# Patient Record
Sex: FEMALE | Race: BLACK OR AFRICAN AMERICAN | NOT HISPANIC OR LATINO | Employment: STUDENT | URBAN - METROPOLITAN AREA
[De-identification: names, ages, dates, MRNs, and addresses within clinical notes are randomized per-mention and may not be internally consistent; named-entity substitution may affect disease eponyms.]

---
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
## 2023-05-20 ENCOUNTER — HOSPITAL ENCOUNTER (EMERGENCY)
Facility: HOSPITAL | Age: 18
Discharge: HOME | End: 2023-05-20
Attending: EMERGENCY MEDICINE
Payer: MEDICAID

## 2023-05-20 VITALS
DIASTOLIC BLOOD PRESSURE: 76 MMHG | HEART RATE: 84 BPM | TEMPERATURE: 98 F | HEIGHT: 60 IN | BODY MASS INDEX: 27.42 KG/M2 | RESPIRATION RATE: 14 BRPM | SYSTOLIC BLOOD PRESSURE: 112 MMHG | WEIGHT: 139.69 LBS | OXYGEN SATURATION: 100 %

## 2023-05-20 DIAGNOSIS — G89.29 CHRONIC HAND PAIN, RIGHT: ICD-10-CM

## 2023-05-20 DIAGNOSIS — M79.641 CHRONIC HAND PAIN, RIGHT: ICD-10-CM

## 2023-05-20 DIAGNOSIS — R21 RASH: Primary | ICD-10-CM

## 2023-05-20 PROCEDURE — 99284 EMERGENCY DEPT VISIT MOD MDM: CPT

## 2023-05-20 RX ORDER — PREDNISONE 20 MG/1
40 TABLET ORAL DAILY
Qty: 10 TABLET | Refills: 0 | Status: SHIPPED | OUTPATIENT
Start: 2023-05-20 | End: 2023-05-25

## 2023-05-20 RX ORDER — HYDROXYZINE HYDROCHLORIDE 25 MG/1
25 TABLET, FILM COATED ORAL 3 TIMES DAILY PRN
Qty: 20 TABLET | Refills: 0 | Status: SHIPPED | OUTPATIENT
Start: 2023-05-20

## 2023-05-20 NOTE — Clinical Note
"JOSELUIS Nicole "A'Tarayell" Walker was seen and treated in our emergency department on 5/20/2023.  She may return to work on 05/22/2023.       If you have any questions or concerns, please don't hesitate to call.      SILVINO Nova"

## 2023-05-20 NOTE — ED PROVIDER NOTES
Encounter Date: 5/20/2023       History     Chief Complaint   Patient presents with    Rash     Pt in with complaints of a rash and hives to the entire body that started 2 days ago.  Pt reports swelling and pain to the right arm.  Denies any shortness of breath or chest pain.     The patient presents with rash and skin problem.  The onset was 2 days ago.  The course/duration of symptoms is constant.  Location: generalized. The character of symptoms is itching.  Radiating symptom(s): none.  The degree of symptoms is minimal.  Risk factors consist of none.  Prior episodes: none.  Associated symptoms: denies fever, denies chills and denies shortness of breath. She also reports chronic right hand pain that she is going to see her pcp for next week.    Review of patient's allergies indicates:   Allergen Reactions    Grass pollen-june grass standard Rash     History reviewed. No pertinent past medical history.  Past Surgical History:   Procedure Laterality Date    SURGICAL REMOVAL OF BUNION WITH OSTEOTOMY OF METATARSAL BONE      TONSILLECTOMY       History reviewed. No pertinent family history.  Social History     Tobacco Use    Smoking status: Every Day     Types: Vaping with nicotine    Smokeless tobacco: Never   Substance Use Topics    Alcohol use: Never    Drug use: Never     Review of Systems   Constitutional:  Negative for fever.   HENT:  Negative for sore throat.    Respiratory:  Negative for shortness of breath.    Cardiovascular:  Negative for chest pain.   Gastrointestinal:  Negative for nausea.   Genitourinary:  Negative for dysuria.   Musculoskeletal:  Negative for back pain.   Skin:  Positive for rash.   Neurological:  Negative for weakness.   Hematological:  Does not bruise/bleed easily.   All other systems reviewed and are negative.    Physical Exam     Initial Vitals [05/20/23 1755]   BP Pulse Resp Temp SpO2   112/76 84 14 98.4 °F (36.9 °C) 100 %      MAP       --         Physical Exam    Nursing note and  vitals reviewed.  Constitutional: She appears well-developed and well-nourished.   HENT:   Head: Normocephalic and atraumatic.   Neck: Neck supple.   Normal range of motion.  Cardiovascular:  Normal rate, regular rhythm, normal heart sounds and intact distal pulses.           Pulmonary/Chest: Breath sounds normal.   Abdominal: Abdomen is soft. Bowel sounds are normal.   Musculoskeletal:         General: Normal range of motion.      Cervical back: Normal range of motion and neck supple.      Comments: Mild ttp right thumb without swelling, FROM, good distal pulses, brisk cap refill, no snuffbox ttp     Neurological: She is alert. She has normal strength.   Skin: Skin is warm and dry.   Few scattered whelps to trunk and extremities consistent with urticaria   Psychiatric: She has a normal mood and affect.       ED Course   Procedures  Labs Reviewed - No data to display       Imaging Results    None          Medications - No data to display  Medical Decision Making:   History:   Old Records Summarized: records from clinic visits and records from previous admission(s).  Was offered xray of right hand - patient refused. She reports she will see her pcp next week. Will take otc pain medication if needed.    7:07 PM DISPOSITION: The patient is resting comfortably in no acute distress.  She is hemodynamically stable and is without objective evidence for acute process requiring urgent intervention or hospitalization. I provided counseling to patient with regard to condition, the treatment plan, specific conditions for return, and the importance of follow up. Detailed written and verbal instructions provided to patient and she expressed a verbal understanding of the discharge instructions and overall management plan. Reiterated the importance of medication administration and safety and advised patient to follow up with primary care provider in 3-5 days or sooner if needed.  Answered questions at this time. The patient is  stable for discharge.                           Clinical Impression:   Final diagnoses:  [R21] Rash (Primary)  [M79.641, G89.29] Chronic hand pain, right        ED Disposition Condition    Discharge Stable          ED Prescriptions       Medication Sig Dispense Start Date End Date Auth. Provider    predniSONE (DELTASONE) 20 MG tablet Take 2 tablets (40 mg total) by mouth once daily. for 5 days 10 tablet 5/20/2023 5/25/2023 SILVINO Nova    hydrOXYzine HCL (ATARAX) 25 MG tablet Take 1 tablet (25 mg total) by mouth 3 (three) times daily as needed for Itching. May cause drowsiness 20 tablet 5/20/2023 -- SILVINO Nova          Follow-up Information       Follow up With Specialties Details Why Contact Info    follow up with your primary care provider in 3-5 days        [facility] Emergency Medicine  If symptoms worsen [address] [facility] [address] [phone]             SILVINO Nova  05/20/23 8063

## 2023-06-22 ENCOUNTER — HOSPITAL ENCOUNTER (EMERGENCY)
Facility: HOSPITAL | Age: 18
Discharge: HOME | End: 2023-06-22
Attending: INTERNAL MEDICINE
Payer: MEDICAID

## 2023-06-22 VITALS
HEART RATE: 65 BPM | OXYGEN SATURATION: 100 % | SYSTOLIC BLOOD PRESSURE: 130 MMHG | BODY MASS INDEX: 25.55 KG/M2 | TEMPERATURE: 98 F | WEIGHT: 138.88 LBS | DIASTOLIC BLOOD PRESSURE: 75 MMHG | HEIGHT: 62 IN | RESPIRATION RATE: 18 BRPM

## 2023-06-22 DIAGNOSIS — Z32.01 POSITIVE PREGNANCY TEST: Primary | ICD-10-CM

## 2023-06-22 PROCEDURE — 99283 EMERGENCY DEPT VISIT LOW MDM: CPT

## 2023-06-22 RX ORDER — FAMOTIDINE 20 MG
1 TABLET ORAL DAILY
Qty: 30 TABLET | Refills: 0 | Status: SHIPPED | OUTPATIENT
Start: 2023-06-22 | End: 2023-07-22

## 2023-06-22 NOTE — ED PROVIDER NOTES
Encounter Date: 6/22/2023       History     Chief Complaint   Patient presents with    Positive UPT     Went to urgent care for pregnancy test.  Was positive. Now wants to see how far along she is.     Patient with no pmhx presents today requesting to know gestational age. Patient presented to an urgent care today for a pregnancy test because her period was late. Test result was positive. Her LMP was 5/18/23. This is her first pregnancy. She denies any acute complaints. Denies pelvic pain or vaginal bleeding.     The history is provided by the patient. No  was used.   Review of patient's allergies indicates:   Allergen Reactions    Grass pollen-june grass standard Rash     No past medical history on file.  Past Surgical History:   Procedure Laterality Date    SURGICAL REMOVAL OF BUNION WITH OSTEOTOMY OF METATARSAL BONE      TONSILLECTOMY       No family history on file.  Social History     Tobacco Use    Smoking status: Every Day     Types: Vaping with nicotine    Smokeless tobacco: Never   Substance Use Topics    Alcohol use: Never    Drug use: Never     Review of Systems   Constitutional:  Negative for chills and fever.   Respiratory:  Negative for cough, chest tightness and shortness of breath.    Cardiovascular:  Negative for chest pain.   Gastrointestinal:  Negative for abdominal pain, constipation, diarrhea, nausea and vomiting.   Genitourinary:  Negative for dysuria, flank pain, hematuria, pelvic pain and vaginal bleeding.   Musculoskeletal:  Negative for back pain.   Skin:  Negative for rash.   Neurological:  Negative for syncope, light-headedness and headaches.   All other systems reviewed and are negative.    Physical Exam     Initial Vitals [06/22/23 1705]   BP Pulse Resp Temp SpO2   130/75 65 18 98 °F (36.7 °C) 100 %      MAP       --         Physical Exam    Nursing note and vitals reviewed.  Constitutional: She appears well-developed and well-nourished. She is not diaphoretic. No  distress.   HENT:   Head: Normocephalic and atraumatic.   Mouth/Throat: Oropharynx is clear and moist. No oropharyngeal exudate.   Eyes: Conjunctivae and EOM are normal.   Neck: Neck supple.   Normal range of motion.  Cardiovascular:  Normal rate, regular rhythm, normal heart sounds and intact distal pulses.           Pulmonary/Chest: Breath sounds normal. No respiratory distress.   Abdominal: Abdomen is soft. She exhibits no distension. There is no abdominal tenderness. There is no rebound and no guarding.   Musculoskeletal:         General: No edema.      Cervical back: Normal range of motion and neck supple.     Neurological: She is alert and oriented to person, place, and time. GCS score is 15. GCS eye subscore is 4. GCS verbal subscore is 5. GCS motor subscore is 6.   Skin: Skin is warm and dry. Capillary refill takes less than 2 seconds. No rash noted.   Psychiatric: She has a normal mood and affect.       ED Course   Procedures  Labs Reviewed - No data to display       Imaging Results    None          Medications - No data to display  Medical Decision Making:   History:   Old Records Summarized: other records.  Initial Assessment:   Newly pregnant patient requesting to find out gestational age   Differential Diagnosis:   Chemical pregnancy, lab error,   Patient is non-toxic appearing. Her vitals are stable. Patient informed she is about 6 weeks per LMP. She understands there is no indication for an ultrasound today. I will refer her to OB clinic. Prenatal care discussed. Rx for pnvs sent to pharmacy. ED precautions given.                     Clinical Impression:   Final diagnoses:  [Z32.01] Positive pregnancy test (Primary)      ED Disposition Condition    Discharge Stable          ED Prescriptions       Medication Sig Dispense Start Date End Date Auth. Provider    prenatal 21-iron fu-folic acid (PRENATAL COMPLETE) 14 mg iron- 400 mcg Tab Take 1 tablet by mouth once daily. 30 tablet 6/22/2023 7/22/2023 Deanna  RICH Alexandra          Follow-up Information       Follow up With Specialties Details Why Contact Info    [facility] Emergency Medicine  If symptoms worsen return to ED immediately [address] [facility] [id number] [phone]    Primary Care Provider within 1-2 days  Go in 2 days      [facility] Obstetrics In 2 days  [address] [facility] [id number] [phone]             RICH Wilson  06/22/23 1730

## 2023-07-10 ENCOUNTER — HOSPITAL ENCOUNTER (EMERGENCY)
Facility: HOSPITAL | Age: 18
Discharge: HOME | End: 2023-07-11
Attending: STUDENT IN AN ORGANIZED HEALTH CARE EDUCATION/TRAINING PROGRAM
Payer: MEDICAID

## 2023-07-10 VITALS
WEIGHT: 138 LBS | HEART RATE: 88 BPM | TEMPERATURE: 99 F | DIASTOLIC BLOOD PRESSURE: 76 MMHG | OXYGEN SATURATION: 100 % | SYSTOLIC BLOOD PRESSURE: 143 MMHG | RESPIRATION RATE: 16 BRPM | BODY MASS INDEX: 25.4 KG/M2 | HEIGHT: 62 IN

## 2023-07-10 DIAGNOSIS — M25.511 ACUTE PAIN OF RIGHT SHOULDER: ICD-10-CM
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
## 2023-07-10 DIAGNOSIS — T07.XXXA MULTIPLE ABRASIONS: ICD-10-CM

## 2023-07-10 DIAGNOSIS — M25.521 RIGHT ELBOW PAIN: ICD-10-CM

## 2023-07-10 DIAGNOSIS — M23.91 ACUTE INTERNAL DERANGEMENT OF RIGHT KNEE: Primary | ICD-10-CM

## 2023-07-10 PROCEDURE — 99284 EMERGENCY DEPT VISIT MOD MDM: CPT | Mod: 25

## 2023-07-10 PROCEDURE — 25000003 PHARM REV CODE 250: Performed by: PHYSICIAN ASSISTANT

## 2023-07-10 PROCEDURE — 25000003 PHARM REV CODE 250: Performed by: NURSE PRACTITIONER

## 2023-07-10 PROCEDURE — 29505 APPLICATION LONG LEG SPLINT: CPT | Mod: RT

## 2023-07-10 RX ORDER — CYCLOBENZAPRINE HCL 10 MG
10 TABLET ORAL 3 TIMES DAILY PRN
Qty: 15 TABLET | Refills: 0 | Status: SHIPPED | OUTPATIENT
Start: 2023-07-10 | End: 2023-07-15

## 2023-07-10 RX ORDER — ACETAMINOPHEN 500 MG
1000 TABLET ORAL
Status: COMPLETED | OUTPATIENT
Start: 2023-07-10 | End: 2023-07-10

## 2023-07-10 RX ORDER — CYCLOBENZAPRINE HCL 10 MG
10 TABLET ORAL
Status: COMPLETED | OUTPATIENT
Start: 2023-07-10 | End: 2023-07-10

## 2023-07-10 RX ADMIN — CYCLOBENZAPRINE 10 MG: 10 TABLET, FILM COATED ORAL at 10:07

## 2023-07-10 RX ADMIN — ACETAMINOPHEN 1000 MG: 500 TABLET, FILM COATED ORAL at 08:07

## 2023-07-10 NOTE — Clinical Note
"JOSELUIS Nicole "A'Bonnie" Walker was seen and treated in our emergency department on 7/10/2023.  She may return to work on 07/17/2023.       If you have any questions or concerns, please don't hesitate to call.      queta sandoval rn RN    "
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
## 2023-07-11 NOTE — DISCHARGE INSTRUCTIONS
Ice to knee and arm to help with swelling. Tylenol for pain (may take 1000mg every 6 hours for pain). Cyclobenzaprine for muscle spasms/tightness as needed. Keep abrasions clean with soap and water and apply triple antibiotic ointment to them. Wear knee immobilizer for the next 2 weeks and use crutches as needed. Once you feel you can put weight on right leg you may do so especially if you

## 2023-09-28 ENCOUNTER — TELEPHONE (OUTPATIENT)
Dept: OBSTETRICS AND GYNECOLOGY | Facility: CLINIC | Age: 18
End: 2023-09-28
Payer: MEDICAID

## 2023-10-24 ENCOUNTER — ROUTINE PRENATAL (OUTPATIENT)
Dept: OBSTETRICS AND GYNECOLOGY | Facility: CLINIC | Age: 18
End: 2023-10-24
Payer: MEDICAID

## 2023-10-24 VITALS
DIASTOLIC BLOOD PRESSURE: 78 MMHG | BODY MASS INDEX: 27.98 KG/M2 | SYSTOLIC BLOOD PRESSURE: 114 MMHG | WEIGHT: 153 LBS | HEART RATE: 93 BPM

## 2023-10-24 DIAGNOSIS — F12.10 TETRAHYDROCANNABINOL (THC) USE DISORDER, MILD, ABUSE: ICD-10-CM

## 2023-10-24 DIAGNOSIS — Z11.3 SCREENING EXAMINATION FOR VENEREAL DISEASE: ICD-10-CM

## 2023-10-24 DIAGNOSIS — Z34.02 ENCOUNTER FOR SUPERVISION OF NORMAL FIRST PREGNANCY IN SECOND TRIMESTER: Primary | ICD-10-CM

## 2023-10-24 DIAGNOSIS — Z3A.22 22 WEEKS GESTATION OF PREGNANCY: ICD-10-CM

## 2023-10-24 PROBLEM — Z34.92 ENCOUNTER FOR SUPERVISION OF NORMAL PREGNANCY IN SECOND TRIMESTER: Status: ACTIVE | Noted: 2023-10-24

## 2023-10-24 PROCEDURE — 99203 OFFICE O/P NEW LOW 30 MIN: CPT | Mod: TH,S$GLB,, | Performed by: STUDENT IN AN ORGANIZED HEALTH CARE EDUCATION/TRAINING PROGRAM

## 2023-10-24 PROCEDURE — 99203 PR OFFICE/OUTPT VISIT, NEW, LEVL III, 30-44 MIN: ICD-10-PCS | Mod: TH,S$GLB,, | Performed by: STUDENT IN AN ORGANIZED HEALTH CARE EDUCATION/TRAINING PROGRAM

## 2023-10-24 NOTE — PROGRESS NOTES
CC: Follow-Up OB    HPI:   18 y.o.  at 22w5d with EDC of 2024, by [facility], here for her follow up OB visit. Complains of nothing today.  Patient is transferring care today from [place].  She reports she is not had any prenatal labs performed so far this pregnancy.  Will obtain these today.  She denies any past medical history.  She reports put surgery and tonsillectomy.  Positive THC use.  She denies any family history.  Denies allergies to medicine.  Denies any medications..    Pregnancy ROS  Positive fetal movement   Negative leakage of fluid   Negative vaginal bleeding   Negative headache, vision changes, RUQ pain, epigastric pain  Negative contractions/abdominal pain   Negative pelvic pressure     Physical Exam:  Prenatal Vitals  BP: 114/78  Weight: 69.4 kg (153 lb)    Wt Readings from Last 3 Encounters:   10/24/23 69.4 kg (153 lb)   07/10/23 62.6 kg (138 lb)   23 63 kg (138 lb 14.2 oz)       Body mass index is 27.98 kg/m².    General: NAD, well developed, well nourished  Psych: alert and oriented to person, time and place, normal affect  HEENT: normocephalic, atraumatic  Abd: Gravid, soft, NT, ND  Skin: warm, dry  Neuro: normal gait, gross motor function intact  No cyanosis, clubbing or edema    FHTs: +    ASSESSMENT: 18 y.o.  @ 22w5d with   Patient Active Problem List   Diagnosis    Encounter for supervision of normal pregnancy in second trimester     PLAN:  PNL today, NIPT  Anatomy scan ordered  Osull on RTC  Counseled on THC cessation and risks associated with pregnancy  Pain, fever, bleeding precautions  DIAN, SOLOMON, PreE precautions  Cont PNV, Iron  Return to clinic in 3 weeks

## 2023-10-25 LAB
CHLAMYDIA: NEGATIVE
GONORRHEA: NEGATIVE
SOURCE: NORMAL
SOURCE: NORMAL
TRICHOMONAS AMPLIFIED: NEGATIVE

## 2023-10-26 LAB
APPEARANCE, UA: CLEAR
BACTERIA SPEC CULT: ABNORMAL /HPF
BILIRUB UR QL STRIP: NEGATIVE MG/DL
COLOR UR: ABNORMAL
GLUCOSE (UA): NORMAL MG/DL
HGB UR QL STRIP: 10 /UL
KETONES UR QL STRIP: NEGATIVE MG/DL
LEUKOCYTE ESTERASE UR QL STRIP: 500 /UL
NITRITE UR QL STRIP: NEGATIVE
PH UR STRIP: 6 PH (ref 5–9)
PROT UR QL STRIP: NEGATIVE MG/DL
RBC #/AREA URNS HPF: ABNORMAL /HPF (ref 0–2)
SERVICE COMMENT 03: ABNORMAL
SP GR UR STRIP: 1.01 (ref 1–1.03)
SPECIMEN COLLECTION METHOD, URINE: ABNORMAL
SQUAMOUS EPITHELIAL, UA: ABNORMAL /LPF
UROBILINOGEN UR STRIP-ACNC: NORMAL MG/DL
WBC #/AREA URNS HPF: ABNORMAL /HPF (ref 0–5)

## 2023-10-28 LAB
APTIMA MEDIA TYPE: NORMAL
C. TRACHOMATIS DNA PROBE RESULT:: NEGATIVE
N. GONORRHOEAE DNA PROBE RESULT:: NEGATIVE
SPECIMEN SOURCE: NORMAL
TRICHOMONAS AMPLIFIED: NEGATIVE

## 2023-10-30 ENCOUNTER — PATIENT MESSAGE (OUTPATIENT)
Dept: OBSTETRICS AND GYNECOLOGY | Facility: CLINIC | Age: 18
End: 2023-10-30
Payer: MEDICAID

## 2023-11-02 ENCOUNTER — PATIENT MESSAGE (OUTPATIENT)
Dept: OTHER | Facility: OTHER | Age: 18
End: 2023-11-02
Payer: MEDICAID

## 2023-11-28 ENCOUNTER — ROUTINE PRENATAL (OUTPATIENT)
Dept: OBSTETRICS AND GYNECOLOGY | Facility: CLINIC | Age: 18
End: 2023-11-28
Payer: MEDICAID

## 2023-11-28 VITALS
DIASTOLIC BLOOD PRESSURE: 70 MMHG | WEIGHT: 159 LBS | SYSTOLIC BLOOD PRESSURE: 103 MMHG | BODY MASS INDEX: 29.08 KG/M2 | HEART RATE: 79 BPM

## 2023-11-28 DIAGNOSIS — Z3A.27 27 WEEKS GESTATION OF PREGNANCY: ICD-10-CM

## 2023-11-28 DIAGNOSIS — Z34.02 ENCOUNTER FOR SUPERVISION OF NORMAL FIRST PREGNANCY IN SECOND TRIMESTER: Primary | ICD-10-CM

## 2023-11-28 PROCEDURE — 99213 PR OFFICE/OUTPT VISIT, EST, LEVL III, 20-29 MIN: ICD-10-PCS | Mod: TH,S$GLB,, | Performed by: STUDENT IN AN ORGANIZED HEALTH CARE EDUCATION/TRAINING PROGRAM

## 2023-11-28 PROCEDURE — 99213 OFFICE O/P EST LOW 20 MIN: CPT | Mod: TH,S$GLB,, | Performed by: STUDENT IN AN ORGANIZED HEALTH CARE EDUCATION/TRAINING PROGRAM

## 2023-12-01 LAB
ABS NRBC COUNT: 0 X 10 3/UL (ref 0–0.01)
ABSOLUTE BASOPHIL: 0.04 X 10 3/UL (ref 0–0.22)
ABSOLUTE EOSINOPHIL: 0.04 X 10 3/UL (ref 0.04–0.54)
ABSOLUTE IMMATURE GRAN: 0.23 X 10 3/UL (ref 0–0.04)
ABSOLUTE LYMPHOCYTE: 1.26 X 10 3/UL (ref 0.86–4.75)
ABSOLUTE MONOCYTE: 0.39 X 10 3/UL (ref 0.22–1.08)
AMPHETAMINES (500): NEGATIVE NG/ML
ANTIBODY SCREEN: NEGATIVE
BARBITURATES (200): NEGATIVE NG/ML
BASOPHILS NFR BLD: 0.7 % (ref 0.2–1.2)
BENZODIAZEPINES: NEGATIVE NG/ML
BLOOD GROUPING: NORMAL
BLOOD TYPE (D): POSITIVE
COCAINE (150): NEGATIVE NG/ML
EOSINOPHIL NFR BLD: 0.7 % (ref 0.7–7)
GLUCOSE 1 HR POST 50 GM: 135 MG/DL
HBV SURFACE AG SERPL QL IA: NONREACTIVE
HCT VFR BLD AUTO: 33.5 % (ref 37–47)
HCV IGG SERPL QL IA: NONREACTIVE
HGB BLD-MCNC: 10.9 G/DL (ref 12–16)
HIV 1+2 AB+HIV1 P24 AG SERPL QL IA: NONREACTIVE
IMMATURE GRANULOCYTES: 3.9 % (ref 0–0.5)
LYMPHOCYTES NFR BLD: 21.3 % (ref 19.3–53.1)
MARIJUANA, THC (50): NEGATIVE NG/ML
MCH RBC QN AUTO: 29.1 PG (ref 27–32)
MCHC RBC AUTO-ENTMCNC: 32.5 G/DL (ref 32–36)
MCV RBC AUTO: 89.6 FL (ref 82–100)
METHADONE: NEGATIVE NG/ML
MONOCYTES NFR BLD: 6.6 % (ref 4.7–12.5)
NEUTROPHILS # BLD AUTO: 3.95 X 10 3/UL (ref 2.15–7.56)
NEUTROPHILS NFR BLD: 66.8 % (ref 34–71.1)
NUCLEATED RED BLOOD CELLS: 0 /100 WBC (ref 0–0.2)
OPIATES: NEGATIVE NG/ML
OXYCODONE: NEGATIVE NG/ML
PH: 6 (ref 4.5–8)
PHENCYCLIDINE (25): NEGATIVE NG/ML
PLATELET # BLD AUTO: 238 X 10 3/UL (ref 135–400)
RBC # BLD AUTO: 3.74 X 10 6/UL (ref 4.2–5.4)
RDW-SD: 42.6 FL (ref 37–54)
RUBELLA IGG SCREEN: NORMAL
SICKLE CELL PREP: NEGATIVE
SYPHILIS TREPONEMAL ANTIBODY: NONREACTIVE
URINE CREATININE D/S: 122.1 MG/DL
URINE CULTURE, ROUTINE: NORMAL
WBC # BLD: 5.91 X 10 3/UL (ref 4.3–10.8)

## 2023-12-08 LAB
AMORPHOUS URINE: ABNORMAL /LPF
APPEARANCE, UA: CLEAR
BACTERIA SPEC CULT: ABNORMAL /HPF
BILIRUB UR QL STRIP: NEGATIVE MG/DL
COLOR UR: ABNORMAL
GLUCOSE (UA): NORMAL MG/DL
HGB UR QL STRIP: 10 /UL
KETONES UR QL STRIP: NEGATIVE MG/DL
LEUKOCYTE ESTERASE UR QL STRIP: 500 /UL
MUCUS URINE: ABNORMAL /LPF
NITRITE UR QL STRIP: NEGATIVE
PH UR STRIP: 7 PH (ref 5–9)
PROT UR QL STRIP: ABNORMAL MG/DL
RBC #/AREA URNS HPF: ABNORMAL /HPF (ref 0–2)
SERVICE COMMENT 03: ABNORMAL
SP GR UR STRIP: 1.01 (ref 1–1.03)
SPECIMEN COLLECTION METHOD, URINE: ABNORMAL
SQUAMOUS EPITHELIAL, UA: ABNORMAL /LPF
UROBILINOGEN UR STRIP-ACNC: NORMAL MG/DL
WBC #/AREA URNS HPF: ABNORMAL /HPF (ref 0–5)
YEAST URINE: ABNORMAL /HPF

## 2023-12-10 LAB — URINE CULTURE, ROUTINE: NORMAL

## 2023-12-11 ENCOUNTER — TELEPHONE (OUTPATIENT)
Dept: OBSTETRICS AND GYNECOLOGY | Facility: CLINIC | Age: 18
End: 2023-12-11
Payer: MEDICAID

## 2023-12-11 NOTE — TELEPHONE ENCOUNTER
Patient was calling to inquire about her urine culture. I informed her that everything did come back good. Pt v/u    ----- Message from Nelly Granger sent at 12/11/2023 10:38 AM CST -----  Type:  Needs Medical Advice    Who Called:JOSELUIS Fong,   Symptoms (please be specific): -   How long has patient had these symptoms:  -  Pharmacy name and phone #:  -  Would the patient rather a call back or a response via [facility]? CB   Best Call Back Number: [phone]    Additional Information:  pt is waiting on a CB to discuss test results w/Dr Winn please call

## 2023-12-14 ENCOUNTER — PATIENT MESSAGE (OUTPATIENT)
Dept: OTHER | Facility: OTHER | Age: 18
End: 2023-12-14
Payer: MEDICAID

## 2023-12-18 ENCOUNTER — ROUTINE PRENATAL (OUTPATIENT)
Dept: OBSTETRICS AND GYNECOLOGY | Facility: CLINIC | Age: 18
End: 2023-12-18
Payer: MEDICAID

## 2023-12-18 DIAGNOSIS — Z3A.30 30 WEEKS GESTATION OF PREGNANCY: ICD-10-CM

## 2023-12-18 DIAGNOSIS — Z34.03 ENCOUNTER FOR SUPERVISION OF NORMAL FIRST PREGNANCY IN THIRD TRIMESTER: Primary | ICD-10-CM

## 2023-12-18 PROCEDURE — 99213 OFFICE O/P EST LOW 20 MIN: CPT | Mod: TH,S$GLB,, | Performed by: STUDENT IN AN ORGANIZED HEALTH CARE EDUCATION/TRAINING PROGRAM

## 2023-12-18 PROCEDURE — 99213 PR OFFICE/OUTPT VISIT, EST, LEVL III, 20-29 MIN: ICD-10-PCS | Mod: TH,S$GLB,, | Performed by: STUDENT IN AN ORGANIZED HEALTH CARE EDUCATION/TRAINING PROGRAM

## 2023-12-18 NOTE — PROGRESS NOTES
CC: Follow-Up OB    HPI:   18 y.o.  at 30w4d with EDC of 2024, by [facility], here for her follow up OB visit. Complains of nothing today.    Pregnancy ROS:  Positive fetal movement   Negative leakage of fluid   Negative vaginal bleeding   Negative headache, vision changes, RUQ pain, epigastric pain  Negative contractions/abdominal pain   Negative pelvic pressure     Physical Exam:       Wt Readings from Last 3 Encounters:   23 72.1 kg (159 lb)   10/24/23 69.4 kg (153 lb)   07/10/23 62.6 kg (138 lb)     There is no height or weight on file to calculate BMI.    General: NAD, well developed, well nourished  Psych: alert and oriented to person, time and place, normal affect  HEENT: normocephalic, atraumatic  Abd: Gravid, soft, NT, ND  Skin: warm, dry  Neuro: normal gait, gross motor function intact  No cyanosis, clubbing or edema    FHTs: +    Maternal Blood Type: A+/-    ASSESSMENT: 18 y.o.  @ 30w4d with   Patient Active Problem List   Diagnosis    Encounter for supervision of normal pregnancy in second trimester    Tetrahydrocannabinol (THC) use disorder, mild, abuse       PLAN:  PNL reviewed, osull wnl  Pain, fever, bleeding precautions  DIAN, SOLOMON, PreE precautions  Cont PNV, Iron  Return to clinic in 2 weeks

## 2023-12-28 ENCOUNTER — PATIENT MESSAGE (OUTPATIENT)
Dept: OTHER | Facility: OTHER | Age: 18
End: 2023-12-28
Payer: MEDICAID

## 2024-01-03 LAB
APPEARANCE, UA: CLEAR
BACTERIA SPEC CULT: ABNORMAL /HPF
BILIRUB UR QL STRIP: NEGATIVE MG/DL
COLOR UR: ABNORMAL
GLUCOSE (UA): NORMAL MG/DL
HGB UR QL STRIP: NEGATIVE /UL
KETONES UR QL STRIP: NEGATIVE MG/DL
LEUKOCYTE ESTERASE UR QL STRIP: 100 /UL
NITRITE UR QL STRIP: NEGATIVE
PH UR STRIP: 6 PH (ref 5–9)
PROT UR QL STRIP: ABNORMAL MG/DL
RBC #/AREA URNS HPF: ABNORMAL /HPF (ref 0–2)
SERVICE COMMENT 03: ABNORMAL
SP GR UR STRIP: 1.02 (ref 1–1.03)
SPECIMEN COLLECTION METHOD, URINE: ABNORMAL
SQUAMOUS EPITHELIAL, UA: ABNORMAL /LPF
UROBILINOGEN UR STRIP-ACNC: NORMAL MG/DL
WBC #/AREA URNS HPF: ABNORMAL /HPF (ref 0–5)

## 2024-01-05 ENCOUNTER — ROUTINE PRENATAL (OUTPATIENT)
Dept: OBSTETRICS AND GYNECOLOGY | Facility: CLINIC | Age: 19
End: 2024-01-05
Payer: MEDICAID

## 2024-01-05 VITALS
SYSTOLIC BLOOD PRESSURE: 114 MMHG | WEIGHT: 175 LBS | HEART RATE: 91 BPM | DIASTOLIC BLOOD PRESSURE: 72 MMHG | BODY MASS INDEX: 32.01 KG/M2

## 2024-01-05 DIAGNOSIS — Z3A.33 33 WEEKS GESTATION OF PREGNANCY: ICD-10-CM

## 2024-01-05 DIAGNOSIS — Z34.03 ENCOUNTER FOR SUPERVISION OF NORMAL FIRST PREGNANCY IN THIRD TRIMESTER: Primary | ICD-10-CM

## 2024-01-05 PROCEDURE — 99213 OFFICE O/P EST LOW 20 MIN: CPT | Mod: TH,S$GLB,, | Performed by: STUDENT IN AN ORGANIZED HEALTH CARE EDUCATION/TRAINING PROGRAM

## 2024-01-05 NOTE — PROGRESS NOTES
CC: Follow-Up OB    HPI:   18 y.o.  at 33w1d with EDC of 2024, by [employer], here for her follow up OB visit. Complains of nothing today.    Pregnancy ROS:  Positive fetal movement   Negative leakage of fluid   Negative vaginal bleeding   Negative headache, vision changes, RUQ pain, epigastric pain  Negative contractions/abdominal pain   Negative pelvic pressure     Physical Exam:  Prenatal Vitals  BP: 114/72  Weight: 79.4 kg (175 lb)    Wt Readings from Last 3 Encounters:   24 79.4 kg (175 lb)   23 72.1 kg (159 lb)   10/24/23 69.4 kg (153 lb)     Body mass index is 32.01 kg/m².    General: NAD, well developed, well nourished  Psych: alert and oriented to person, time and place, normal affect  HEENT: normocephalic, atraumatic  Abd: Gravid, soft, NT, ND  Skin: warm, dry  Neuro: normal gait, gross motor function intact  No cyanosis, clubbing or edema    FHTs: +    Maternal Blood Type: A+/-    ASSESSMENT: 18 y.o.  @ 33w1d with   Patient Active Problem List   Diagnosis    Encounter for supervision of normal pregnancy in second trimester    Tetrahydrocannabinol (THC) use disorder, mild, abuse       PLAN:  PNL and manish reviewed   Pain, fever, bleeding precautions  DIAN, SOLOMON, PreE precautions  Cont PNV, Iron  Return to clinic in 2 weeks      
normal...

## 2024-01-18 ENCOUNTER — PATIENT MESSAGE (OUTPATIENT)
Dept: OTHER | Facility: OTHER | Age: 19
End: 2024-01-18
Payer: MEDICAID

## 2024-01-18 LAB
APPEARANCE, UA: CLEAR
BACTERIA SPEC CULT: ABNORMAL /HPF
BASOPHILS NFR BLD: 0.3 % (ref 0–3)
BILIRUB UR QL STRIP: NEGATIVE MG/DL
COLOR UR: ABNORMAL
EOSINOPHIL NFR BLD: 0 % (ref 1–3)
ERYTHROCYTE [DISTWIDTH] IN BLOOD BY AUTOMATED COUNT: 13.5 % (ref 12.5–18)
GLUCOSE (UA): NORMAL MG/DL
HCT VFR BLD AUTO: 34.1 % (ref 37–47)
HGB BLD-MCNC: 11.2 G/DL (ref 12–16)
HGB UR QL STRIP: 10 /UL
KETONES UR QL STRIP: NEGATIVE MG/DL
LEUKOCYTE ESTERASE UR QL STRIP: 500 /UL
LYMPHOCYTES NFR BLD: 11.8 % (ref 25–40)
MCH RBC QN AUTO: 27.7 PG (ref 27–31.2)
MCHC RBC AUTO-ENTMCNC: 32.8 G/DL (ref 31.8–35.4)
MCV RBC AUTO: 84.2 FL (ref 80–97)
MONOCYTES NFR BLD: 9.6 % (ref 1–15)
NEUTROPHILS # BLD AUTO: 5.36 10*3/UL (ref 1.8–7.7)
NEUTROPHILS NFR BLD: 73.6 % (ref 37–80)
NITRITE UR QL STRIP: NEGATIVE
NUCLEATED RED BLOOD CELLS: 0 %
PH UR STRIP: 6 PH (ref 5–9)
PLATELETS: 246 10*3/UL (ref 142–424)
PROT UR QL STRIP: 30 MG/DL
RBC # BLD AUTO: 4.05 10*6/UL (ref 4.2–5.4)
RBC #/AREA URNS HPF: ABNORMAL /HPF (ref 0–2)
SERVICE COMMENT 03: ABNORMAL
SP GR UR STRIP: 1.02 (ref 1–1.03)
SPECIMEN COLLECTION METHOD, URINE: ABNORMAL
SQUAMOUS EPITHELIAL, UA: ABNORMAL /LPF
UROBILINOGEN UR STRIP-ACNC: NORMAL MG/DL
WBC # BLD: 7.3 10*3/UL (ref 4.6–10.2)
WBC #/AREA URNS HPF: ABNORMAL /HPF (ref 0–5)
YEAST URINE: ABNORMAL /HPF

## 2024-01-19 ENCOUNTER — ROUTINE PRENATAL (OUTPATIENT)
Dept: OBSTETRICS AND GYNECOLOGY | Facility: CLINIC | Age: 19
End: 2024-01-19
Payer: MEDICAID

## 2024-01-19 VITALS
BODY MASS INDEX: 32.37 KG/M2 | HEART RATE: 91 BPM | WEIGHT: 177 LBS | DIASTOLIC BLOOD PRESSURE: 74 MMHG | SYSTOLIC BLOOD PRESSURE: 109 MMHG

## 2024-01-19 DIAGNOSIS — Z34.03 ENCOUNTER FOR SUPERVISION OF NORMAL FIRST PREGNANCY IN THIRD TRIMESTER: Primary | ICD-10-CM

## 2024-01-19 DIAGNOSIS — O21.9 NAUSEA AND VOMITING DURING PREGNANCY: ICD-10-CM

## 2024-01-19 DIAGNOSIS — Z3A.35 35 WEEKS GESTATION OF PREGNANCY: ICD-10-CM

## 2024-01-19 LAB — RPR: NON REACTIVE

## 2024-01-19 PROCEDURE — 99213 OFFICE O/P EST LOW 20 MIN: CPT | Mod: TH,S$GLB,, | Performed by: STUDENT IN AN ORGANIZED HEALTH CARE EDUCATION/TRAINING PROGRAM

## 2024-01-19 RX ORDER — ONDANSETRON 4 MG/1
4 TABLET, FILM COATED ORAL EVERY 6 HOURS PRN
Qty: 30 TABLET | Refills: 1 | Status: SHIPPED | OUTPATIENT
Start: 2024-01-19 | End: 2025-01-18

## 2024-01-19 NOTE — PROGRESS NOTES
CC: Follow-Up OB    HPI:   18 y.o.  at 35w1d with EDC of 2024, by [employer], here for her follow up OB visit. Complains of n/v and occasional ctx. Pt was seen in the [facility] yesterday with similar complaints. She was found to have a yeast infection and was treated. Cvx was closed this morning.     Pregnancy ROS:  Positive fetal movement   Negative leakage of fluid   Negative vaginal bleeding   Negative headache, vision changes, RUQ pain, epigastric pain  Negative contractions/abdominal pain   Negative pelvic pressure     Physical Exam:  Prenatal Vitals  BP: 109/74  Weight: 80.3 kg (177 lb)    Wt Readings from Last 3 Encounters:   24 80.3 kg (177 lb)   24 79.4 kg (175 lb)   23 72.1 kg (159 lb)       Body mass index is 32.37 kg/m².    General: NAD, well developed, well nourished  Psych: alert and oriented to person, time and place, normal affect  HEENT: normocephalic, atraumatic  Abd: Gravid, soft, NT, ND  Skin: warm, dry  Neuro: normal gait, gross motor function intact  No cyanosis, clubbing or edema    FHTs: +    Maternal Blood Type: A+/-    ASSESSMENT: 18 y.o.  @ 35w1d with   Patient Active Problem List   Diagnosis    Encounter for supervision of normal pregnancy in second trimester    Tetrahydrocannabinol (THC) use disorder, mild, abuse     PLAN:  GBS on RTC  Zofran given for n/v  Pt counseled on staying well hydrated  Pain, fever, bleeding precautions  DIAN, SOLOMON, PreE precautions  Cont PNV, Iron  Return to clinic in 1 weeks

## 2024-01-20 LAB — URINE CULTURE, ROUTINE: NORMAL

## 2024-01-30 ENCOUNTER — PATIENT MESSAGE (OUTPATIENT)
Dept: OBSTETRICS AND GYNECOLOGY | Facility: CLINIC | Age: 19
End: 2024-01-30
Payer: MEDICAID

## 2024-02-02 ENCOUNTER — ROUTINE PRENATAL (OUTPATIENT)
Dept: OBSTETRICS AND GYNECOLOGY | Facility: CLINIC | Age: 19
End: 2024-02-02
Payer: MEDICAID

## 2024-02-02 VITALS
BODY MASS INDEX: 34.46 KG/M2 | WEIGHT: 188.38 LBS | SYSTOLIC BLOOD PRESSURE: 125 MMHG | DIASTOLIC BLOOD PRESSURE: 80 MMHG

## 2024-02-02 DIAGNOSIS — Z34.03 ENCOUNTER FOR SUPERVISION OF NORMAL FIRST PREGNANCY IN THIRD TRIMESTER: Primary | ICD-10-CM

## 2024-02-02 DIAGNOSIS — Z3A.37 37 WEEKS GESTATION OF PREGNANCY: ICD-10-CM

## 2024-02-02 PROCEDURE — 99213 OFFICE O/P EST LOW 20 MIN: CPT | Mod: TH,S$GLB,, | Performed by: STUDENT IN AN ORGANIZED HEALTH CARE EDUCATION/TRAINING PROGRAM

## 2024-02-02 NOTE — PROGRESS NOTES
CC: Follow-Up OB    HPI:   18 y.o.  at 37w1d with EDC of 2024, by [employer], here for her follow up OB visit. Complains of nothing.    Pregnancy ROS:  Positive fetal movement   Negative leakage of fluid   Negative vaginal bleeding   Negative headache, vision changes, RUQ pain, epigastric pain  Negative contractions/abdominal pain   Negative pelvic pressure     Physical Exam:  Prenatal Vitals  BP: 125/80  Weight: 85.5 kg (188 lb 6.4 oz)    Wt Readings from Last 3 Encounters:   24 85.5 kg (188 lb 6.4 oz)   24 80.3 kg (177 lb)   24 79.4 kg (175 lb)       Body mass index is 34.46 kg/m².    General: NAD, well developed, well nourished  Psych: alert and oriented to person, time and place, normal affect  HEENT: normocephalic, atraumatic  Abd: Gravid, soft, NT, ND  Skin: warm, dry  Neuro: normal gait, gross motor function intact  Pelvic: NEFG. Cvx cl/th/hi  No cyanosis, clubbing or edema    FHTs: +    Maternal Blood Type: A+/-    ASSESSMENT: 18 y.o.  @ 37w1d with   Patient Active Problem List   Diagnosis    Encounter for supervision of normal pregnancy in second trimester    Tetrahydrocannabinol (THC) use disorder, mild, abuse     PLAN:  GBS today  Discussed IOL, pt agreeable  IOL scheduled for  with cytotec  Pain, fever, bleeding precautions  DIAN, SOLOMON, PreE precautions  Cont PNV, Iron  Return to clinic in 1 weeks      
Home

## 2024-02-05 LAB
GROUP B STREP MOLECULAR: POSITIVE
PENICILLIN ALLERGIC: NO

## 2024-02-07 ENCOUNTER — OUTSIDE PLACE OF SERVICE (OUTPATIENT)
Dept: OBSTETRICS AND GYNECOLOGY | Facility: CLINIC | Age: 19
End: 2024-02-07
Payer: MEDICAID

## 2024-02-07 ENCOUNTER — PATIENT MESSAGE (OUTPATIENT)
Dept: OBSTETRICS AND GYNECOLOGY | Facility: CLINIC | Age: 19
End: 2024-02-07

## 2024-02-07 LAB — A1 MICROGLOB PLACENTAL VAG QL: NORMAL

## 2024-02-07 PROCEDURE — 59514 CESAREAN DELIVERY ONLY: CPT | Mod: AT,,, | Performed by: OBSTETRICS & GYNECOLOGY

## 2024-02-08 PROCEDURE — 99231 SBSQ HOSP IP/OBS SF/LOW 25: CPT | Mod: ,,, | Performed by: OBSTETRICS & GYNECOLOGY

## 2024-02-09 PROCEDURE — 99231 SBSQ HOSP IP/OBS SF/LOW 25: CPT | Mod: ,,, | Performed by: OBSTETRICS & GYNECOLOGY

## 2024-02-10 ENCOUNTER — OUTSIDE PLACE OF SERVICE (OUTPATIENT)
Dept: OBSTETRICS AND GYNECOLOGY | Facility: CLINIC | Age: 19
End: 2024-02-10
Payer: MEDICAID

## 2024-02-10 PROCEDURE — 99238 HOSP IP/OBS DSCHRG MGMT 30/<: CPT | Mod: ,,, | Performed by: OBSTETRICS & GYNECOLOGY

## 2024-02-12 PROBLEM — O99.820 GBS (GROUP B STREPTOCOCCUS CARRIER), +RV CULTURE, CURRENTLY PREGNANT: Status: ACTIVE | Noted: 2024-02-12

## 2024-02-13 ENCOUNTER — TELEPHONE (OUTPATIENT)
Dept: OBSTETRICS AND GYNECOLOGY | Facility: CLINIC | Age: 19
End: 2024-02-13
Payer: MEDICAID

## 2024-02-13 NOTE — TELEPHONE ENCOUNTER
----- Message from Pati Hollins sent at 2/13/2024  9:20 AM CST -----  Contact: self  Type: Staff Message    Caller: JOSELUIS Fong  Call Back Number: [phone]  Nature of the Call: pt needing a return to work excuse for thursday 02/08-10/2024 and return to work on 0211/2024. Please email it to: [email or web address]  Additional Information: na

## 2024-02-13 NOTE — TELEPHONE ENCOUNTER
----- Message from Pati Hollins sent at 2/13/2024  9:20 AM CST -----  Contact: self  Type: Staff Message    Caller: JOSELUIS Fong  Call Back Number: [phone]  Nature of the Call: pt needing a return to work excuse for thursday 02/08-10/2024 and return to work on 0211/2024. Please email it to: [email or web address]  Additional Information: na

## 2024-02-14 ENCOUNTER — TELEPHONE (OUTPATIENT)
Dept: OBSTETRICS AND GYNECOLOGY | Facility: CLINIC | Age: 19
End: 2024-02-14
Payer: MEDICAID

## 2024-02-14 NOTE — TELEPHONE ENCOUNTER
Marcela calling in regards to patient work excuse. I informed her that patient can not go back to work until 6 weeks post delivery and a new letter was sent to HR.     The patient also was called as well and informed of the mishap. 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 However, due to patient being 1 week post c/s, she can not be released. Pt v/u      ----- Message from Julieth Tavarez sent at 2/14/2024 10:04 AM CST -----  Contact: Marcela(HR at [employer])  Marcela called to consult with nurse or staff regarding the patient work excuse. She is trying to get clarification and wanted to speak with clinic regarding this. She would like a call back and can be reached at [phone] or [phone]. Thanks/MR

## 2024-02-27 ENCOUNTER — TELEPHONE (OUTPATIENT)
Dept: OBSTETRICS AND GYNECOLOGY | Facility: CLINIC | Age: 19
End: 2024-02-27

## 2024-02-27 ENCOUNTER — TELEPHONE (OUTPATIENT)
Dept: OBSTETRICS AND GYNECOLOGY | Facility: CLINIC | Age: 19
End: 2024-02-27
Payer: MEDICAID

## 2024-02-27 NOTE — TELEPHONE ENCOUNTER
----- Message from Julieth Tavarez sent at 2/27/2024  2:55 PM CST -----  Contact: Patient  Patient called to consult with nurse or staff regarding her appointment today for 3pm. She states she is still waiting on her ride and may be about 30-35 minutes. If needing to reach patient she can be contacted at  [phone]. Thanks/

## 2024-02-27 NOTE — TELEPHONE ENCOUNTER
Patient calling for SDA due to infection in incision. Patient is scheduled to come in today. Pt v/u      ----- Message from Sabirmedical [place] sent at 2/27/2024  8:38 AM CST -----  Contact: Bonnie  .Type:  Same Day Appointment Request    Caller is requesting a same day appointment.  Caller declined first available appointment listed below.    Name of Caller: Bonnie  When is the first available appointment? March 122  Symptoms: pt was hospitalized due to c section scar infection. Finished antibiotics and is in a lot of pain. Area is still slightly leaking pus & blood   Best Call Back Number: .[phone]   Additional Information:

## 2024-02-29 ENCOUNTER — POSTPARTUM VISIT (OUTPATIENT)
Dept: OBSTETRICS AND GYNECOLOGY | Facility: CLINIC | Age: 19
End: 2024-02-29
Payer: MEDICAID

## 2024-02-29 VITALS
HEART RATE: 86 BPM | DIASTOLIC BLOOD PRESSURE: 83 MMHG | WEIGHT: 173 LBS | SYSTOLIC BLOOD PRESSURE: 120 MMHG | BODY MASS INDEX: 31.64 KG/M2

## 2024-02-29 DIAGNOSIS — Z98.891 S/P C-SECTION: Primary | ICD-10-CM

## 2024-02-29 PROCEDURE — 99024 POSTOP FOLLOW-UP VISIT: CPT | Mod: S$GLB,,, | Performed by: STUDENT IN AN ORGANIZED HEALTH CARE EDUCATION/TRAINING PROGRAM

## 2024-02-29 NOTE — PROGRESS NOTES
Chief Complaint: post op    HPI: Patient is a 18 y.o. y.o. female  who presents to clinic for postop  visit.  Patient is 3 weeks postop, she reports being seen outside clinic and was told she had infection was given antibiotics.  She presents today for follow up.  Does report some pain around incision however no draining or redness.  Denies any vaginal bleeding.  She is ambulating, voiding, tolerating p.o. diet without difficulty.  She denies any fevers.  She has no other complaints today.  Review of systems negative unless mentioned above.    Physical Exam:  Vitals:    24 1001   BP: 120/83   Pulse: 86   Weight: 78.5 kg (173 lb)       Gen: NAD, well developed, well nourished  Psych: alert and oriented x 3, normal affect  HEENT: normocephalic, atraumatic  Abd: soft, non-tender, non-distended, Pfannenstiel incision healing well no signs of infection noted  Skin: warm and dry  Ext: no c/c/c, moves all extremities  Neurological: normal gait, gross motor function intact    Assessment: Patient is a 18 y.o. y.o. female  with  Patient Active Problem List   Diagnosis    Encounter for supervision of normal pregnancy in second trimester    Tetrahydrocannabinol (THC) use disorder, mild, abuse    GBS (group B Streptococcus carrier), +RV culture, currently pregnant     Plan:  Patient given reassurance, no signs of infection noted on exam today   Patient counseled on continuing postop restrictions  Counseled on wound care   Return to clinic in 3 weeks for follow-up    Mir Winn MD

## 2024-03-13 ENCOUNTER — PATIENT MESSAGE (OUTPATIENT)
Dept: OBSTETRICS AND GYNECOLOGY | Facility: CLINIC | Age: 19
End: 2024-03-13
Payer: MEDICAID

## 2024-03-13 ENCOUNTER — TELEPHONE (OUTPATIENT)
Dept: OBSTETRICS AND GYNECOLOGY | Facility: CLINIC | Age: 19
End: 2024-03-13
Payer: MEDICAID

## 2024-03-13 NOTE — TELEPHONE ENCOUNTER
I Messaged patient via [employer]      ----- Message from Kayleen Hough sent at 3/13/2024 11:41 AM CDT -----  Name of Who is Calling:JOSELUIS GUEVARA [[id number]]                What is the request in detail: Pt calling and requesting a note to return back to work,sates she would like tomorrow.Please call back to further assist.                 Can the clinic reply by [facility]: No                What [facility] to [facility] Back [facility] not [facility]: [phone]    
94

## 2024-03-22 ENCOUNTER — PATIENT MESSAGE (OUTPATIENT)
Dept: OBSTETRICS AND GYNECOLOGY | Facility: CLINIC | Age: 19
End: 2024-03-22
Payer: MEDICAID

## 2024-03-26 ENCOUNTER — POSTPARTUM VISIT (OUTPATIENT)
Dept: OBSTETRICS AND GYNECOLOGY | Facility: CLINIC | Age: 19
End: 2024-03-26
Payer: MEDICAID

## 2024-03-26 VITALS
SYSTOLIC BLOOD PRESSURE: 129 MMHG | WEIGHT: 172 LBS | DIASTOLIC BLOOD PRESSURE: 76 MMHG | BODY MASS INDEX: 31.46 KG/M2 | HEART RATE: 89 BPM

## 2024-03-26 DIAGNOSIS — Z98.891 STATUS POST C-SECTION: Primary | ICD-10-CM

## 2024-03-26 PROBLEM — O99.820 GBS (GROUP B STREPTOCOCCUS CARRIER), +RV CULTURE, CURRENTLY PREGNANT: Status: RESOLVED | Noted: 2024-02-12 | Resolved: 2024-03-26

## 2024-03-26 PROBLEM — Z34.92 ENCOUNTER FOR SUPERVISION OF NORMAL PREGNANCY IN SECOND TRIMESTER: Status: RESOLVED | Noted: 2023-10-24 | Resolved: 2024-03-26

## 2024-03-26 NOTE — LETTER
March 26, 2024      [facility] ([facility]) - OB GYN  [address]  [place] [address]  Phone: [phone]  Fax: [phone]       Patient: JOSELUIS Fong   YOB: 2005  Date of Visit: 03/26/2024    To Whom It May Concern:    Negra Fong  was at [facility] on 03/26/2024. The patient may return to work on 3/27/2024 with no restrictions. If you have any questions or concerns, or if I can be of further assistance, please do not hesitate to contact me.    Sincerely,    MD Rohit Estes MA

## 2024-03-26 NOTE — PROGRESS NOTES
Patient is a 18-year-old black female status post  present clinic for 6 week postpartum visit.  Patient doing well today.  She is formula feeding without difficulty.  She declines contraception today.  She reports having a menstrual cycle 2 weeks ago.  She has no other complaints at this time.  She denies VB/VD/dysuria/Denies any HA, vision changes, F/C, LE swelling. Denies any breast pain/soreness. Denies postpartum depression.     Vitals:    24 1403   BP: 129/76   Pulse: 89   Weight: 78 kg (172 lb)     Body mass index is 31.46 kg/m².  Gen:WDWN in NAD  NC/AT  Non labored breathing   Abd soft, NT/ND, incision well healed  Skin: warm and dry  Ext no edema      Patient Active Problem List   Diagnosis    Tetrahydrocannabinol (THC) use disorder, mild, abuse    Status post        Plan   Patient doing well postop   Patient released from postop restrictions   We discussed contraception, she declines today  RTC 1 yr annual /PRN

## 2024-05-23 ENCOUNTER — POSTPARTUM VISIT (OUTPATIENT)
Dept: OBSTETRICS AND GYNECOLOGY | Facility: CLINIC | Age: 19
End: 2024-05-23
Payer: MEDICAID

## 2024-05-23 VITALS
DIASTOLIC BLOOD PRESSURE: 78 MMHG | SYSTOLIC BLOOD PRESSURE: 144 MMHG | BODY MASS INDEX: 30.36 KG/M2 | HEART RATE: 105 BPM | WEIGHT: 166 LBS

## 2024-05-23 PROCEDURE — 99213 OFFICE O/P EST LOW 20 MIN: CPT | Mod: S$GLB,,, | Performed by: STUDENT IN AN ORGANIZED HEALTH CARE EDUCATION/TRAINING PROGRAM

## 2024-05-23 NOTE — PROGRESS NOTES
Chief Complaint: postpartum depression    HPI: Patient is a 19 y.o. y.o. female  who presents to GYN clinic for postpartum depression.  Patient reports feeling down most days, she reports she will have episodes of crying and irritation.  Reports decreased sleep and concentration.  Denies any homicidal suicidal ideations today.  She is also concerned about possibly being pregnant, UPT was negative today.  She has no other complaints at this time.  Review of systems negative unless mentioned above..    Physical Exam:  Vitals:    24 0910   BP: (!) 144/78   Pulse: 105   Weight: 75.3 kg (166 lb)     Gen: NAD, well developed, well nourished  Psych: alert and oriented x 3, normal affect  HEENT: normocephalic, atraumatic  Abd: soft, non-tender, non-distended  Skin: Warm and dry  Ext: no c/c/c, moves all extremities  Neurological: normal gait, gross motor function intact    Assessment: Patient is a 19 y.o. y.o. female  with  Patient Active Problem List   Diagnosis    Tetrahydrocannabinol (THC) use disorder, mild, abuse    Status post      Plan:  UPT negative today   Patient with postpartum depression, discuss treatment options with antidepressant   Will start patient on Zoloft 25 mg daily  Patient return to clinic in 2 weeks for follow-up    Mir Winn MD

## 2024-05-28 ENCOUNTER — PATIENT MESSAGE (OUTPATIENT)
Dept: OBSTETRICS AND GYNECOLOGY | Facility: CLINIC | Age: 19
End: 2024-05-28
Payer: MEDICAID

## 2024-05-29 RX ORDER — SERTRALINE HYDROCHLORIDE 25 MG/1
25 TABLET, FILM COATED ORAL DAILY
Qty: 30 TABLET | Refills: 11 | Status: CANCELLED | OUTPATIENT
Start: 2024-05-29 | End: 2025-05-29

## 2024-05-30 RX ORDER — SERTRALINE HYDROCHLORIDE 25 MG/1
25 TABLET, FILM COATED ORAL DAILY
Qty: 30 TABLET | Refills: 11 | Status: SHIPPED | OUTPATIENT
Start: 2024-05-30 | End: 2025-05-30

## 2024-06-18 ENCOUNTER — E-VISIT (OUTPATIENT)
Dept: OBSTETRICS AND GYNECOLOGY | Facility: CLINIC | Age: 19
End: 2024-06-18
Payer: MEDICAID

## 2024-06-18 DIAGNOSIS — N92.6 ABNORMAL MENSTRUAL CYCLE: Primary | ICD-10-CM

## 2024-06-18 NOTE — PROGRESS NOTES
Patient ID: JOSELUIS Fong is a 19 y.o. female.    Chief Complaint: Absent Menses    The patient initiated a request through [employer] on 6/18/2024 for evaluation and management with a chief complaint of Absent Menses     I evaluated the questionnaire submission on 06/18/2024  .    [facility]    6/18/2024  2:48 PM CDT - Filed by Patient   Do you agree to participate in an E-Visit? Yes   If you have any of the following symptoms, please present to your local emergency room or call 911:  I acknowledge   Are you pregnant, could you be pregnant, or are you breast feeding? Could be pregnant   What is the main issue you would like addressed today? My period is 7 days late and i wanted to see if i can get a referral to a infertility center to make sure my egg count is good and makd sure i can still conceive a baby because me and boyfriend been trying for 3 months   Please describe your symptoms Nauseated, mood swings, craving certain stuff,sleeping aloy   Where is your problem located? Stomach   How severe are your symptoms? Moderate   Have you had these symptoms before? Yes   How long have you been having these symptoms? For a week   Please list any medications or treatments you have used for your condition and indicate if it was effective or not. N/A   What makes this feel better? Sleep and food   What makes this feel worse? N/A   Are these symptoms related to a condition that you currently have? No   Please describe any probable cause for these symptoms N/A   Provide any additional information you feel is important. N/A   Please attach any relevant images or files    Are you able to take your vital signs? No         Encounter Diagnosis   Name Primary?    Abnormal menstrual cycle Yes        No orders of the defined types were placed in this encounter.       Message sent to pt in regards to her late menstrual cycle and need to take home UPT if her cycle newsome snot occur. Also discussed short interval  pregnancy and recommendation to wait 18-24 months to conceive.     Follow up in about 4 weeks (around 7/16/2024).      E-Visit Time Tracking:

## 2024-08-07 ENCOUNTER — PATIENT MESSAGE (OUTPATIENT)
Dept: OBSTETRICS AND GYNECOLOGY | Facility: CLINIC | Age: 19
End: 2024-08-07
Payer: MEDICAID

## 2025-02-15 LAB
APPEARANCE, UA: CLEAR
BILIRUB UR QL STRIP: NEGATIVE MG/DL
COLOR UR: ABNORMAL
GLUCOSE (UA): NORMAL MG/DL
HGB UR QL STRIP: NEGATIVE /UL
KETONES UR QL STRIP: NEGATIVE MG/DL
LEUKOCYTE ESTERASE UR QL STRIP: NEGATIVE /UL
NITRITE UR QL STRIP: NEGATIVE
PH UR STRIP: 7 PH (ref 5–8)
PROT UR QL STRIP: ABNORMAL MG/DL
SP GR UR STRIP: 1.01 (ref 1–1.03)
SPECIMEN COLLECTION METHOD, URINE: ABNORMAL
UROBILINOGEN UR STRIP-ACNC: NORMAL MG/DL